# Patient Record
Sex: MALE | Race: WHITE | NOT HISPANIC OR LATINO | Employment: OTHER | URBAN - METROPOLITAN AREA
[De-identification: names, ages, dates, MRNs, and addresses within clinical notes are randomized per-mention and may not be internally consistent; named-entity substitution may affect disease eponyms.]

---

## 2021-07-16 ENCOUNTER — HOSPITAL ENCOUNTER (INPATIENT)
Facility: MEDICAL CENTER | Age: 66
LOS: 1 days | DRG: 808 | End: 2021-07-18
Attending: EMERGENCY MEDICINE | Admitting: STUDENT IN AN ORGANIZED HEALTH CARE EDUCATION/TRAINING PROGRAM
Payer: MEDICARE

## 2021-07-16 ENCOUNTER — APPOINTMENT (OUTPATIENT)
Dept: RADIOLOGY | Facility: MEDICAL CENTER | Age: 66
DRG: 808 | End: 2021-07-16
Attending: EMERGENCY MEDICINE
Payer: MEDICARE

## 2021-07-16 DIAGNOSIS — R06.00 DYSPNEA, UNSPECIFIED TYPE: ICD-10-CM

## 2021-07-16 LAB
ABO GROUP BLD: NORMAL
ALBUMIN SERPL BCP-MCNC: 3.7 G/DL (ref 3.2–4.9)
ALBUMIN/GLOB SERPL: 0.9 G/DL
ALP SERPL-CCNC: 85 U/L (ref 30–99)
ALT SERPL-CCNC: 10 U/L (ref 2–50)
ANION GAP SERPL CALC-SCNC: 19 MMOL/L (ref 7–16)
AST SERPL-CCNC: 17 U/L (ref 12–45)
BARCODED ABORH UBTYP: 600
BARCODED PRD CODE UBPRD: NORMAL
BARCODED UNIT NUM UBUNT: NORMAL
BASOPHILS # BLD AUTO: 0.4 % (ref 0–1.8)
BASOPHILS # BLD: 0.01 K/UL (ref 0–0.12)
BILIRUB SERPL-MCNC: 0.8 MG/DL (ref 0.1–1.5)
BLD GP AB SCN SERPL QL: NORMAL
BUN SERPL-MCNC: 21 MG/DL (ref 8–22)
CALCIUM SERPL-MCNC: 8.7 MG/DL (ref 8.5–10.5)
CHLORIDE SERPL-SCNC: 99 MMOL/L (ref 96–112)
CO2 SERPL-SCNC: 19 MMOL/L (ref 20–33)
COMPONENT R 8504R: NORMAL
CREAT SERPL-MCNC: 2.27 MG/DL (ref 0.5–1.4)
EKG IMPRESSION: NORMAL
EOSINOPHIL # BLD AUTO: 0.03 K/UL (ref 0–0.51)
EOSINOPHIL NFR BLD: 1.1 % (ref 0–6.9)
ERYTHROCYTE [DISTWIDTH] IN BLOOD BY AUTOMATED COUNT: 71.4 FL (ref 35.9–50)
FLUAV RNA SPEC QL NAA+PROBE: NEGATIVE
FLUBV RNA SPEC QL NAA+PROBE: NEGATIVE
GLOBULIN SER CALC-MCNC: 4.1 G/DL (ref 1.9–3.5)
GLUCOSE SERPL-MCNC: 171 MG/DL (ref 65–99)
HCT VFR BLD AUTO: 21.2 % (ref 42–52)
HGB BLD-MCNC: 6.5 G/DL (ref 14–18)
IMM GRANULOCYTES # BLD AUTO: 0.02 K/UL (ref 0–0.11)
IMM GRANULOCYTES NFR BLD AUTO: 0.7 % (ref 0–0.9)
LIPASE SERPL-CCNC: 8 U/L (ref 11–82)
LYMPHOCYTES # BLD AUTO: 1.84 K/UL (ref 1–4.8)
LYMPHOCYTES NFR BLD: 68.4 % (ref 22–41)
MCH RBC QN AUTO: 32.5 PG (ref 27–33)
MCHC RBC AUTO-ENTMCNC: 30.7 G/DL (ref 33.7–35.3)
MCV RBC AUTO: 106 FL (ref 81.4–97.8)
MONOCYTES # BLD AUTO: 0.12 K/UL (ref 0–0.85)
MONOCYTES NFR BLD AUTO: 4.5 % (ref 0–13.4)
NEUTROPHILS # BLD AUTO: 0.67 K/UL (ref 1.82–7.42)
NEUTROPHILS NFR BLD: 24.9 % (ref 44–72)
NRBC # BLD AUTO: 0.08 K/UL
NRBC BLD-RTO: 3 /100 WBC
NT-PROBNP SERPL IA-MCNC: 1581 PG/ML (ref 0–125)
PLATELET # BLD AUTO: 101 K/UL (ref 164–446)
PMV BLD AUTO: 11.1 FL (ref 9–12.9)
POTASSIUM SERPL-SCNC: 4.1 MMOL/L (ref 3.6–5.5)
PRODUCT TYPE UPROD: NORMAL
PROT SERPL-MCNC: 7.8 G/DL (ref 6–8.2)
RBC # BLD AUTO: 2 M/UL (ref 4.7–6.1)
RH BLD: NORMAL
RSV RNA SPEC QL NAA+PROBE: NEGATIVE
SARS-COV-2 RNA RESP QL NAA+PROBE: NOTDETECTED
SODIUM SERPL-SCNC: 137 MMOL/L (ref 135–145)
SPECIMEN SOURCE: NORMAL
TROPONIN T SERPL-MCNC: 28 NG/L (ref 6–19)
UNIT STATUS USTAT: NORMAL
WBC # BLD AUTO: 2.7 K/UL (ref 4.8–10.8)

## 2021-07-16 PROCEDURE — 99285 EMERGENCY DEPT VISIT HI MDM: CPT

## 2021-07-16 PROCEDURE — 85610 PROTHROMBIN TIME: CPT

## 2021-07-16 PROCEDURE — 93005 ELECTROCARDIOGRAM TRACING: CPT | Performed by: EMERGENCY MEDICINE

## 2021-07-16 PROCEDURE — 84484 ASSAY OF TROPONIN QUANT: CPT

## 2021-07-16 PROCEDURE — 80053 COMPREHEN METABOLIC PANEL: CPT

## 2021-07-16 PROCEDURE — 86900 BLOOD TYPING SEROLOGIC ABO: CPT

## 2021-07-16 PROCEDURE — 83880 ASSAY OF NATRIURETIC PEPTIDE: CPT

## 2021-07-16 PROCEDURE — 83690 ASSAY OF LIPASE: CPT

## 2021-07-16 PROCEDURE — 0241U HCHG SARS-COV-2 COVID-19 NFCT DS RESP RNA 4 TRGT MIC: CPT

## 2021-07-16 PROCEDURE — 93005 ELECTROCARDIOGRAM TRACING: CPT

## 2021-07-16 PROCEDURE — C9803 HOPD COVID-19 SPEC COLLECT: HCPCS | Performed by: EMERGENCY MEDICINE

## 2021-07-16 PROCEDURE — 85025 COMPLETE CBC W/AUTO DIFF WBC: CPT

## 2021-07-16 PROCEDURE — 86850 RBC ANTIBODY SCREEN: CPT

## 2021-07-16 PROCEDURE — 85730 THROMBOPLASTIN TIME PARTIAL: CPT

## 2021-07-16 PROCEDURE — 71045 X-RAY EXAM CHEST 1 VIEW: CPT

## 2021-07-16 PROCEDURE — 86901 BLOOD TYPING SEROLOGIC RH(D): CPT

## 2021-07-16 ASSESSMENT — PAIN DESCRIPTION - PAIN TYPE: TYPE: CHRONIC PAIN

## 2021-07-17 ENCOUNTER — APPOINTMENT (OUTPATIENT)
Dept: RADIOLOGY | Facility: MEDICAL CENTER | Age: 66
DRG: 808 | End: 2021-07-17
Attending: STUDENT IN AN ORGANIZED HEALTH CARE EDUCATION/TRAINING PROGRAM
Payer: MEDICARE

## 2021-07-17 PROBLEM — C92.00 AML (ACUTE MYELOBLASTIC LEUKEMIA) (HCC): Status: ACTIVE | Noted: 2021-07-17

## 2021-07-17 PROBLEM — Z79.4 TYPE 2 DIABETES MELLITUS WITH KIDNEY COMPLICATION, WITH LONG-TERM CURRENT USE OF INSULIN (HCC): Status: ACTIVE | Noted: 2021-07-17

## 2021-07-17 PROBLEM — D61.818 PANCYTOPENIA (HCC): Status: ACTIVE | Noted: 2021-07-17

## 2021-07-17 PROBLEM — F32.1 CURRENT MODERATE EPISODE OF MAJOR DEPRESSIVE DISORDER (HCC): Status: ACTIVE | Noted: 2021-07-17

## 2021-07-17 PROBLEM — N17.9 AKI (ACUTE KIDNEY INJURY) (HCC): Status: ACTIVE | Noted: 2021-07-17

## 2021-07-17 PROBLEM — E78.5 HYPERLIPIDEMIA: Status: ACTIVE | Noted: 2021-07-17

## 2021-07-17 PROBLEM — R11.2 NON-INTRACTABLE VOMITING WITH NAUSEA: Status: ACTIVE | Noted: 2021-07-17

## 2021-07-17 PROBLEM — I10 HYPERTENSION: Status: ACTIVE | Noted: 2021-07-17

## 2021-07-17 PROBLEM — D64.9 SYMPTOMATIC ANEMIA: Status: ACTIVE | Noted: 2021-07-17

## 2021-07-17 PROBLEM — I25.10 CAD S/P PERCUTANEOUS CORONARY ANGIOPLASTY: Status: ACTIVE | Noted: 2021-07-17

## 2021-07-17 PROBLEM — Z98.61 CAD S/P PERCUTANEOUS CORONARY ANGIOPLASTY: Status: ACTIVE | Noted: 2021-07-17

## 2021-07-17 PROBLEM — J96.01 ACUTE RESPIRATORY FAILURE WITH HYPOXIA (HCC): Status: ACTIVE | Noted: 2021-07-17

## 2021-07-17 PROBLEM — E11.9 DM (DIABETES MELLITUS) (HCC): Status: ACTIVE | Noted: 2021-07-17

## 2021-07-17 PROBLEM — E66.01 MORBID OBESITY (HCC): Status: ACTIVE | Noted: 2021-07-17

## 2021-07-17 PROBLEM — K21.9 GASTROESOPHAGEAL REFLUX DISEASE WITHOUT ESOPHAGITIS: Status: ACTIVE | Noted: 2021-07-17

## 2021-07-17 PROBLEM — E87.29 METABOLIC ACIDOSIS, INCREASED ANION GAP: Status: ACTIVE | Noted: 2021-07-17

## 2021-07-17 PROBLEM — G47.33 OBSTRUCTIVE SLEEP APNEA: Status: ACTIVE | Noted: 2021-07-17

## 2021-07-17 PROBLEM — R79.1 SUPRATHERAPEUTIC INR: Status: ACTIVE | Noted: 2021-07-17

## 2021-07-17 PROBLEM — E11.29 TYPE 2 DIABETES MELLITUS WITH KIDNEY COMPLICATION, WITH LONG-TERM CURRENT USE OF INSULIN (HCC): Status: ACTIVE | Noted: 2021-07-17

## 2021-07-17 LAB
ABO + RH BLD: NORMAL
ANION GAP SERPL CALC-SCNC: 14 MMOL/L (ref 7–16)
ANISOCYTOSIS BLD QL SMEAR: ABNORMAL
APPEARANCE UR: ABNORMAL
APTT PPP: 128.1 SEC (ref 24.7–36)
BACTERIA #/AREA URNS HPF: NEGATIVE /HPF
BASOPHILS # BLD AUTO: 0 % (ref 0–1.8)
BASOPHILS # BLD: 0 K/UL (ref 0–0.12)
BILIRUB UR QL STRIP.AUTO: NEGATIVE
BUN SERPL-MCNC: 27 MG/DL (ref 8–22)
CALCIUM SERPL-MCNC: 8.7 MG/DL (ref 8.5–10.5)
CHLORIDE SERPL-SCNC: 100 MMOL/L (ref 96–112)
CO2 SERPL-SCNC: 20 MMOL/L (ref 20–33)
COLOR UR: YELLOW
CREAT SERPL-MCNC: 2.48 MG/DL (ref 0.5–1.4)
EOSINOPHIL # BLD AUTO: 0 K/UL (ref 0–0.51)
EOSINOPHIL NFR BLD: 0 % (ref 0–6.9)
EPI CELLS #/AREA URNS HPF: NEGATIVE /HPF
ERYTHROCYTE [DISTWIDTH] IN BLOOD BY AUTOMATED COUNT: 68.6 FL (ref 35.9–50)
ERYTHROCYTE [DISTWIDTH] IN BLOOD BY AUTOMATED COUNT: 72.6 FL (ref 35.9–50)
GLUCOSE SERPL-MCNC: 150 MG/DL (ref 65–99)
GLUCOSE UR STRIP.AUTO-MCNC: NEGATIVE MG/DL
HCT VFR BLD AUTO: 19.8 % (ref 42–52)
HCT VFR BLD AUTO: 23.7 % (ref 42–52)
HGB BLD-MCNC: 6.2 G/DL (ref 14–18)
HGB BLD-MCNC: 7.8 G/DL (ref 14–18)
HYALINE CASTS #/AREA URNS LPF: ABNORMAL /LPF
INR PPP: >=10 (ref 0.87–1.13)
INR PPP: >=10 (ref 0.87–1.13)
KETONES UR STRIP.AUTO-MCNC: NEGATIVE MG/DL
LACTATE BLD-SCNC: 2 MMOL/L (ref 0.5–2)
LEUKOCYTE ESTERASE UR QL STRIP.AUTO: NEGATIVE
LYMPHOCYTES # BLD AUTO: 1.62 K/UL (ref 1–4.8)
LYMPHOCYTES NFR BLD: 77 % (ref 22–41)
MACROCYTES BLD QL SMEAR: ABNORMAL
MANUAL DIFF BLD: NORMAL
MCH RBC QN AUTO: 32.4 PG (ref 27–33)
MCH RBC QN AUTO: 33.3 PG (ref 27–33)
MCHC RBC AUTO-ENTMCNC: 31.3 G/DL (ref 33.7–35.3)
MCHC RBC AUTO-ENTMCNC: 32.9 G/DL (ref 33.7–35.3)
MCV RBC AUTO: 106.5 FL (ref 81.4–97.8)
MCV RBC AUTO: 98.3 FL (ref 81.4–97.8)
MICRO URNS: ABNORMAL
MICROCYTES BLD QL SMEAR: ABNORMAL
MONOCYTES # BLD AUTO: 0.02 K/UL (ref 0–0.85)
MONOCYTES NFR BLD AUTO: 0.9 % (ref 0–13.4)
MORPHOLOGY BLD-IMP: NORMAL
NEUTROPHILS # BLD AUTO: 0.37 K/UL (ref 1.82–7.42)
NEUTROPHILS NFR BLD: 17.7 % (ref 44–72)
NITRITE UR QL STRIP.AUTO: NEGATIVE
NRBC # BLD AUTO: 0.03 K/UL
NRBC BLD-RTO: 1.4 /100 WBC
OVALOCYTES BLD QL SMEAR: NORMAL
PATH REV: NORMAL
PATH REV: NORMAL
PH UR STRIP.AUTO: 5.5 [PH] (ref 5–8)
PLATELET # BLD AUTO: 90 K/UL (ref 164–446)
PLATELET # BLD AUTO: 92 K/UL (ref 164–446)
PLATELET BLD QL SMEAR: NORMAL
PMV BLD AUTO: 10.6 FL (ref 9–12.9)
PMV BLD AUTO: 11.5 FL (ref 9–12.9)
POIKILOCYTOSIS BLD QL SMEAR: NORMAL
POLYCHROMASIA BLD QL SMEAR: NORMAL
POTASSIUM SERPL-SCNC: 3.9 MMOL/L (ref 3.6–5.5)
PROT UR QL STRIP: 30 MG/DL
PROTHROMBIN TIME: 87.4 SEC (ref 12–14.6)
PROTHROMBIN TIME: 95.7 SEC (ref 12–14.6)
RBC # BLD AUTO: 1.86 M/UL (ref 4.7–6.1)
RBC # BLD AUTO: 2.41 M/UL (ref 4.7–6.1)
RBC # URNS HPF: ABNORMAL /HPF
RBC BLD AUTO: PRESENT
RBC UR QL AUTO: ABNORMAL
SODIUM SERPL-SCNC: 134 MMOL/L (ref 135–145)
SP GR UR STRIP.AUTO: 1.02
UROBILINOGEN UR STRIP.AUTO-MCNC: 0.2 MG/DL
WBC # BLD AUTO: 2 K/UL (ref 4.8–10.8)
WBC # BLD AUTO: 2.1 K/UL (ref 4.8–10.8)
WBC #/AREA URNS HPF: ABNORMAL /HPF
WBC OTHER NFR BLD MANUAL: 4.4 %

## 2021-07-17 PROCEDURE — 30233N1 TRANSFUSION OF NONAUTOLOGOUS RED BLOOD CELLS INTO PERIPHERAL VEIN, PERCUTANEOUS APPROACH: ICD-10-PCS | Performed by: STUDENT IN AN ORGANIZED HEALTH CARE EDUCATION/TRAINING PROGRAM

## 2021-07-17 PROCEDURE — 85610 PROTHROMBIN TIME: CPT

## 2021-07-17 PROCEDURE — 86923 COMPATIBILITY TEST ELECTRIC: CPT | Mod: 91

## 2021-07-17 PROCEDURE — 85027 COMPLETE CBC AUTOMATED: CPT | Mod: 91

## 2021-07-17 PROCEDURE — 85007 BL SMEAR W/DIFF WBC COUNT: CPT

## 2021-07-17 PROCEDURE — 700111 HCHG RX REV CODE 636 W/ 250 OVERRIDE (IP): Performed by: STUDENT IN AN ORGANIZED HEALTH CARE EDUCATION/TRAINING PROGRAM

## 2021-07-17 PROCEDURE — 83605 ASSAY OF LACTIC ACID: CPT

## 2021-07-17 PROCEDURE — 80500 HCHG CLINICAL PATH CONSULT-LIMITED: CPT

## 2021-07-17 PROCEDURE — 700102 HCHG RX REV CODE 250 W/ 637 OVERRIDE(OP): Performed by: STUDENT IN AN ORGANIZED HEALTH CARE EDUCATION/TRAINING PROGRAM

## 2021-07-17 PROCEDURE — 80048 BASIC METABOLIC PNL TOTAL CA: CPT

## 2021-07-17 PROCEDURE — 93005 ELECTROCARDIOGRAM TRACING: CPT | Performed by: STUDENT IN AN ORGANIZED HEALTH CARE EDUCATION/TRAINING PROGRAM

## 2021-07-17 PROCEDURE — 99223 1ST HOSP IP/OBS HIGH 75: CPT | Mod: AI | Performed by: STUDENT IN AN ORGANIZED HEALTH CARE EDUCATION/TRAINING PROGRAM

## 2021-07-17 PROCEDURE — 86945 BLOOD PRODUCT/IRRADIATION: CPT | Mod: 91

## 2021-07-17 PROCEDURE — 770004 HCHG ROOM/CARE - ONCOLOGY PRIVATE *

## 2021-07-17 PROCEDURE — P9016 RBC LEUKOCYTES REDUCED: HCPCS

## 2021-07-17 PROCEDURE — 74176 CT ABD & PELVIS W/O CONTRAST: CPT | Mod: MG

## 2021-07-17 PROCEDURE — 94760 N-INVAS EAR/PLS OXIMETRY 1: CPT

## 2021-07-17 PROCEDURE — 36430 TRANSFUSION BLD/BLD COMPNT: CPT

## 2021-07-17 PROCEDURE — 36415 COLL VENOUS BLD VENIPUNCTURE: CPT

## 2021-07-17 PROCEDURE — 81001 URINALYSIS AUTO W/SCOPE: CPT

## 2021-07-17 PROCEDURE — 700105 HCHG RX REV CODE 258: Performed by: STUDENT IN AN ORGANIZED HEALTH CARE EDUCATION/TRAINING PROGRAM

## 2021-07-17 PROCEDURE — A9270 NON-COVERED ITEM OR SERVICE: HCPCS | Performed by: STUDENT IN AN ORGANIZED HEALTH CARE EDUCATION/TRAINING PROGRAM

## 2021-07-17 RX ORDER — PROCHLORPERAZINE MALEATE 10 MG
10 TABLET ORAL EVERY 6 HOURS PRN
Status: DISCONTINUED | OUTPATIENT
Start: 2021-07-17 | End: 2021-07-18 | Stop reason: HOSPADM

## 2021-07-17 RX ORDER — BUPROPION HYDROCHLORIDE 150 MG/1
150 TABLET, EXTENDED RELEASE ORAL 2 TIMES DAILY
Status: DISCONTINUED | OUTPATIENT
Start: 2021-07-17 | End: 2021-07-18 | Stop reason: HOSPADM

## 2021-07-17 RX ORDER — FUROSEMIDE 10 MG/ML
40 INJECTION INTRAMUSCULAR; INTRAVENOUS ONCE
Status: DISCONTINUED | OUTPATIENT
Start: 2021-07-17 | End: 2021-07-17

## 2021-07-17 RX ORDER — LORAZEPAM 2 MG/ML
0.5 INJECTION INTRAMUSCULAR ONCE
Status: COMPLETED | OUTPATIENT
Start: 2021-07-17 | End: 2021-07-17

## 2021-07-17 RX ORDER — BUPROPION HYDROCHLORIDE 150 MG/1
150 TABLET, EXTENDED RELEASE ORAL 2 TIMES DAILY
COMMUNITY
Start: 2021-03-15

## 2021-07-17 RX ORDER — ATORVASTATIN CALCIUM 40 MG/1
40 TABLET, FILM COATED ORAL EVERY EVENING
COMMUNITY
Start: 2021-02-19 | End: 2023-02-19

## 2021-07-17 RX ORDER — ALBUTEROL SULFATE 90 UG/1
2 AEROSOL, METERED RESPIRATORY (INHALATION)
COMMUNITY
Start: 2020-10-19 | End: 2022-10-19

## 2021-07-17 RX ORDER — ACETAMINOPHEN 325 MG/1
650 TABLET ORAL EVERY 6 HOURS PRN
Status: DISCONTINUED | OUTPATIENT
Start: 2021-07-17 | End: 2021-07-18 | Stop reason: HOSPADM

## 2021-07-17 RX ORDER — PROCHLORPERAZINE MALEATE 10 MG
10 TABLET ORAL 2 TIMES DAILY
COMMUNITY
Start: 2021-06-28 | End: 2023-06-28

## 2021-07-17 RX ORDER — ATORVASTATIN CALCIUM 40 MG/1
40 TABLET, FILM COATED ORAL EVERY EVENING
Status: DISCONTINUED | OUTPATIENT
Start: 2021-07-17 | End: 2021-07-18 | Stop reason: HOSPADM

## 2021-07-17 RX ORDER — LISINOPRIL 5 MG/1
5 TABLET ORAL DAILY
COMMUNITY
Start: 2021-03-15 | End: 2023-03-15

## 2021-07-17 RX ORDER — HYDROMORPHONE HYDROCHLORIDE 1 MG/ML
1 INJECTION, SOLUTION INTRAMUSCULAR; INTRAVENOUS; SUBCUTANEOUS EVERY 4 HOURS PRN
Status: DISCONTINUED | OUTPATIENT
Start: 2021-07-17 | End: 2021-07-17 | Stop reason: ALTCHOICE

## 2021-07-17 RX ORDER — OMEPRAZOLE 20 MG/1
20 CAPSULE, DELAYED RELEASE ORAL DAILY
Status: DISCONTINUED | OUTPATIENT
Start: 2021-07-17 | End: 2021-07-18 | Stop reason: HOSPADM

## 2021-07-17 RX ORDER — SODIUM CHLORIDE 9 MG/ML
500 INJECTION, SOLUTION INTRAVENOUS ONCE
Status: DISCONTINUED | OUTPATIENT
Start: 2021-07-17 | End: 2021-07-17

## 2021-07-17 RX ORDER — ONDANSETRON 8 MG/1
8 TABLET, ORALLY DISINTEGRATING ORAL 2 TIMES DAILY
COMMUNITY

## 2021-07-17 RX ORDER — LORAZEPAM 2 MG/ML
1 INJECTION INTRAMUSCULAR EVERY 4 HOURS PRN
Status: DISCONTINUED | OUTPATIENT
Start: 2021-07-17 | End: 2021-07-18 | Stop reason: HOSPADM

## 2021-07-17 RX ORDER — ACETAMINOPHEN 325 MG/1
650 TABLET ORAL
COMMUNITY
Start: 2021-05-26 | End: 2021-09-21

## 2021-07-17 RX ORDER — WARFARIN SODIUM 5 MG/1
7.5-1 TABLET ORAL DAILY
COMMUNITY
Start: 2021-05-24

## 2021-07-17 RX ORDER — PANTOPRAZOLE SODIUM 40 MG/1
40 TABLET, DELAYED RELEASE ORAL DAILY
COMMUNITY
Start: 2021-05-24 | End: 2023-05-24

## 2021-07-17 RX ORDER — SODIUM CHLORIDE 9 MG/ML
INJECTION, SOLUTION INTRAVENOUS CONTINUOUS
Status: DISCONTINUED | OUTPATIENT
Start: 2021-07-17 | End: 2021-07-17 | Stop reason: ALTCHOICE

## 2021-07-17 RX ORDER — CICLESONIDE 160 UG/1
1 AEROSOL, METERED RESPIRATORY (INHALATION) DAILY
COMMUNITY
Start: 2020-10-20 | End: 2022-10-20

## 2021-07-17 RX ORDER — FUROSEMIDE 20 MG/1
60 TABLET ORAL 2 TIMES DAILY
COMMUNITY
Start: 2021-03-15 | End: 2023-03-15

## 2021-07-17 RX ORDER — GLUCOSAMINE/CHONDROITIN/C/MANG 500-400 MG
1 CAPSULE ORAL DAILY
COMMUNITY

## 2021-07-17 RX ORDER — PROCHLORPERAZINE EDISYLATE 5 MG/ML
10 INJECTION INTRAMUSCULAR; INTRAVENOUS EVERY 6 HOURS PRN
Status: DISCONTINUED | OUTPATIENT
Start: 2021-07-17 | End: 2021-07-18 | Stop reason: HOSPADM

## 2021-07-17 RX ORDER — ASPIRIN 81 MG/1
81 TABLET ORAL DAILY
COMMUNITY

## 2021-07-17 RX ORDER — CYANOCOBALAMIN 1000 UG/ML
1000 INJECTION, SOLUTION INTRAMUSCULAR; SUBCUTANEOUS DAILY
COMMUNITY
Start: 2021-02-24 | End: 2022-03-22

## 2021-07-17 RX ADMIN — BUPROPION HYDROCHLORIDE 150 MG: 150 TABLET, EXTENDED RELEASE ORAL at 14:06

## 2021-07-17 RX ADMIN — HYDROMORPHONE HYDROCHLORIDE 1 MG: 1 INJECTION, SOLUTION INTRAMUSCULAR; INTRAVENOUS; SUBCUTANEOUS at 02:48

## 2021-07-17 RX ADMIN — LORAZEPAM 0.5 MG: 2 INJECTION INTRAMUSCULAR; INTRAVENOUS at 09:27

## 2021-07-17 RX ADMIN — PHYTONADIONE 10 MG: 10 INJECTION, EMULSION INTRAMUSCULAR; INTRAVENOUS; SUBCUTANEOUS at 09:40

## 2021-07-17 RX ADMIN — OMEPRAZOLE 20 MG: 20 CAPSULE, DELAYED RELEASE ORAL at 05:53

## 2021-07-17 RX ADMIN — SODIUM CHLORIDE: 9 INJECTION, SOLUTION INTRAVENOUS at 02:48

## 2021-07-17 RX ADMIN — BUPROPION HYDROCHLORIDE 150 MG: 150 TABLET, EXTENDED RELEASE ORAL at 04:05

## 2021-07-17 RX ADMIN — PROCHLORPERAZINE EDISYLATE 10 MG: 5 INJECTION INTRAMUSCULAR; INTRAVENOUS at 14:07

## 2021-07-17 RX ADMIN — ATORVASTATIN CALCIUM 40 MG: 40 TABLET, FILM COATED ORAL at 18:44

## 2021-07-17 ASSESSMENT — ENCOUNTER SYMPTOMS
NECK PAIN: 0
SHORTNESS OF BREATH: 0
BRUISES/BLEEDS EASILY: 0
CHILLS: 0
MYALGIAS: 0
DIARRHEA: 0
BACK PAIN: 0
CONSTIPATION: 0
SORE THROAT: 0
HEMOPTYSIS: 0
HEADACHES: 0
DEPRESSION: 1
SINUS PAIN: 0
ABDOMINAL PAIN: 0
NERVOUS/ANXIOUS: 0
FLANK PAIN: 0
FEVER: 0
EYE PAIN: 0
NAUSEA: 1
BLOOD IN STOOL: 0
VOMITING: 1

## 2021-07-17 ASSESSMENT — PAIN DESCRIPTION - PAIN TYPE
TYPE: ACUTE PAIN

## 2021-07-17 NOTE — PROGRESS NOTES
"4 Eyes Skin Assessment Completed by ELIZABETH Broussard and ELIZABETH Vegas.    Head Scab  Ears Redness and Blanching, Wound \"Skin Cancer\" per patient. Picture uploaded in Epic  Nose WDL  Mouth WDL  Neck WDL  Breast/Chest Scar  Shoulder Blades WDL  Spine WDL  (R) Arm/Elbow/Hand WDL  (L) Arm/Elbow/Hand Scar  Abdomen Scar and Bruising  Groin WDL  Scrotum/Coccyx/Buttocks WDL  (R) Leg Bruising   (L) Leg Scar and Bruising Dusky  (R) Heel/Foot/Toe Blanching Dry and Flaky  (L) Heel/Foot/Toe Blanching Dry and Flaky          Devices In Places Pulse Ox and Nasal Cannula      Interventions In Place NC W/Ear Foams, Waffle Overlay, Pillows and Pressure Redistribution Mattress    Possible Skin Injury No    Pictures Uploaded Into Epic Yes  Wound Consult Placed N/A  RN Wound Prevention Protocol Ordered No     "

## 2021-07-17 NOTE — PROGRESS NOTES
Seferino from Lab called with critical result of WBC 2.1 at 0825. Critical lab result read back to Seferino.   This critical lab result is within parameters established by  for this patient

## 2021-07-17 NOTE — ED PROVIDER NOTES
ED Provider Note    CHIEF COMPLAINT  Chief Complaint   Patient presents with   • GI Bleeding       HPI  Juan Chen is a 66 y.o. male who presents with difficulty with breathing.  The patient states he has had progressive increased work of breathing over the last 24 to 48 hours.  He states he does have a productive cough.  As for the GI bleeding noted in triage.  The patient states he has hemorrhoids and occasionally noticed some bright red blood per his rectum.  He states he was recently diagnosed with leukemia at the end of May and did receive chemo injections and is scheduled for repeat exam by his oncologist when he returns to [place] on Sunday from vacation.  He states he did have the Covid vaccine.  He does have associated cramping abdominal pain with some diarrhea.  He has not had any fevers.    REVIEW OF SYSTEMS  See HPI for further details. All other systems are negative.     PAST MEDICAL HISTORY  No past medical history on file.    FAMILY HISTORY  [unfilled]    SOCIAL HISTORY  Social History     Socioeconomic History   • Marital status: Not on file     Spouse name: Not on file   • Number of children: Not on file   • Years of education: Not on file   • Highest education level: Not on file   Occupational History   • Not on file   Tobacco Use   • Smoking status: Not on file   Substance and Sexual Activity   • Alcohol use: Not on file   • Drug use: Not on file   • Sexual activity: Not on file   Other Topics Concern   • Not on file   Social History Narrative   • Not on file     Social Determinants of Health     Financial Resource Strain:    • Difficulty of Paying Living Expenses:    Food Insecurity:    • Worried About Running Out of Food in the Last Year:    • Ran Out of Food in the Last Year:    Transportation Needs:    • Lack of Transportation (Medical):    • Lack of Transportation (Non-Medical):    Physical Activity:    • Days of Exercise per Week:    • Minutes of Exercise per Session:    Stress:   "  • Feeling of Stress :    Social Connections:    • Frequency of Communication with Friends and Family:    • Frequency of Social Gatherings with Friends and Family:    • Attends [employer] Services:    • Active Member of Clubs or Organizations:    • Attends Club or Organization Meetings:    • Marital Status:    Intimate Partner Violence:    • Fear of Current or Ex-Partner:    • Emotionally Abused:    • Physically Abused:    • Sexually Abused:        SURGICAL HISTORY  No past surgical history on file.    CURRENT MEDICATIONS  Home Medications    **Home medications have not yet been reviewed for this encounter**         ALLERGIES  Allergies   Allergen Reactions   • Fiorinal    • Other Misc      Paper tape         PHYSICAL EXAM  VITAL SIGNS: /59   Pulse (!) 113   Temp 37 °C (98.6 °F) (Oral)   Resp 20   Ht 1.778 m (5' 10\")   Wt 118 kg (260 lb)   SpO2 92%   BMI 37.31 kg/m²       Constitutional: Ill in appearance  HENT: Normocephalic, Atraumatic, Bilateral external ears normal, Oropharynx moist, No oral exudates, Nose normal.   Eyes: PERRLA, EOMI, Conjunctiva normal, No discharge.   Neck: Normal range of motion, No tenderness, Supple, No stridor.   Lymphatic: No lymphadenopathy noted.   Cardiovascular: Tachycardic heart rate, Normal rhythm, No murmurs, No rubs, No gallops.   Thorax & Lungs: Symmetrically diminished throughout with diffuse rhonchi  Abdomen: Bowel sounds normal, Soft, mild diffuse tenderness, No masses, No pulsatile masses.   Skin: Warm, Dry, No erythema, No rash.   Back: No tenderness, No CVA tenderness.    Extremities: Intact distal pulses, No edema, No tenderness, No cyanosis, No clubbing.   Neurologic: Alert & oriented x 3, Normal motor function, Normal sensory function, No focal deficits noted.   Psychiatric: Affect normal, Judgment normal, Mood normal.     Results for orders placed or performed during the hospital encounter of 07/16/21   COD (ADULT)   Result Value Ref Range    ABO Grouping " Only A     Rh Grouping Only NEG     Antibody Screen-Cod NEG    CBC WITH DIFFERENTIAL   Result Value Ref Range    WBC 2.7 (L) 4.8 - 10.8 K/uL    RBC 2.00 (L) 4.70 - 6.10 M/uL    Hemoglobin 6.5 (L) 14.0 - 18.0 g/dL    Hematocrit 21.2 (L) 42.0 - 52.0 %    .0 (H) 81.4 - 97.8 fL    MCH 32.5 27.0 - 33.0 pg    MCHC 30.7 (L) 33.7 - 35.3 g/dL    RDW 71.4 (H) 35.9 - 50.0 fL    Platelet Count 101 (L) 164 - 446 K/uL    MPV 11.1 9.0 - 12.9 fL    Neutrophils-Polys 24.90 (L) 44.00 - 72.00 %    Lymphocytes 68.40 (H) 22.00 - 41.00 %    Monocytes 4.50 0.00 - 13.40 %    Eosinophils 1.10 0.00 - 6.90 %    Basophils 0.40 0.00 - 1.80 %    Immature Granulocytes 0.70 0.00 - 0.90 %    Nucleated RBC 3.00 /100 WBC    Neutrophils (Absolute) 0.67 (L) 1.82 - 7.42 K/uL    Lymphs (Absolute) 1.84 1.00 - 4.80 K/uL    Monos (Absolute) 0.12 0.00 - 0.85 K/uL    Eos (Absolute) 0.03 0.00 - 0.51 K/uL    Baso (Absolute) 0.01 0.00 - 0.12 K/uL    Immature Granulocytes (abs) 0.02 0.00 - 0.11 K/uL    NRBC (Absolute) 0.08 K/uL   COMP METABOLIC PANEL   Result Value Ref Range    Sodium 137 135 - 145 mmol/L    Potassium 4.1 3.6 - 5.5 mmol/L    Chloride 99 96 - 112 mmol/L    Co2 19 (L) 20 - 33 mmol/L    Anion Gap 19.0 (H) 7.0 - 16.0    Glucose 171 (H) 65 - 99 mg/dL    Bun 21 8 - 22 mg/dL    Creatinine 2.27 (H) 0.50 - 1.40 mg/dL    Calcium 8.7 8.5 - 10.5 mg/dL    AST(SGOT) 17 12 - 45 U/L    ALT(SGPT) 10 2 - 50 U/L    Alkaline Phosphatase 85 30 - 99 U/L    Total Bilirubin 0.8 0.1 - 1.5 mg/dL    Albumin 3.7 3.2 - 4.9 g/dL    Total Protein 7.8 6.0 - 8.2 g/dL    Globulin 4.1 (H) 1.9 - 3.5 g/dL    A-G Ratio 0.9 g/dL   LIPASE   Result Value Ref Range    Lipase 8 (L) 11 - 82 U/L   COV-2, FLU A/B, AND RSV BY PCR (2-4 HOURS CEPHEID): Collect NP swab in VTM    Specimen: Nasopharyngeal; Respirate   Result Value Ref Range    Influenza virus A RNA Negative Negative    Influenza virus B, PCR Negative Negative    RSV, PCR Negative Negative    SARS-CoV-2 by PCR NotDetected      SARS-CoV-2 Source NP Swab    ESTIMATED GFR   Result Value Ref Range    GFR If African American 35 (A) >60 mL/min/1.73 m 2    GFR If Non  29 (A) >60 mL/min/1.73 m 2   proBrain Natriuretic Peptide, NT   Result Value Ref Range    NT-proBNP 1581 (H) 0 - 125 pg/mL   TROPONIN   Result Value Ref Range    Troponin T 28 (H) 6 - 19 ng/L   EKG (NOW)   Result Value Ref Range    Report       [facility] Emergency Dept.    Test Date:  2021  Pt Name:    TYREL LOERA                  Department: ER  MRN:        [id number]                      Room:  Gender:     Male                         Technician: [id number]  :        1955                   Requested By:ER TRIAGE PROTOCOL  Order #:    [id number]                    Reading MD:    Measurements  Intervals                                Axis  Rate:       118                          P:          7  NV:         124                          QRS:        58  QRSD:       160                          T:          9  QT:         376  QTc:        528    Interpretive Statements  SINUS TACHYCARDIA  RIGHT BUNDLE BRANCH BLOCK  No previous ECG available for comparison         RADIOLOGY/PROCEDURES  DX-CHEST-PORTABLE (1 VIEW)   Final Result            1. Mild interstitial prominence could relate to mild fluid overload.   2. Left basilar opacity could be atelectasis, scarring or infection.   3. Several sternal wires are fractured.            COURSE & MEDICAL DECISION MAKING  Pertinent Labs & Imaging studies reviewed. (See chart for details)  This a 66-year-old gentleman who presents the emergency department with difficulty with breathing.  I suspect this is a combination of heart failure, altitude, and his anemia.  He does have a slight elevation in his troponin but has not had any chest pain.  His EKG is abnormal but there is no obvious ST segment elevation.  The patient is from [place] we don't have labs to compare what his baseline is.  He does have a  known history of leukemia and I suspect this is the cause of his pancytopenia.  I not see any obvious source of bleeding at this time.  He states he has some hemorrhoids with some bright red blood and I do not suspect he has an acute GI bleed.  The patient's BNP and chest x-ray does support heart failure.  I do not want to give Lasix as his blood pressures like 120 systolic and he has an elevation in his creatinine.  We'll bit the patient to the hospital for further work-up and treatment.    FINAL IMPRESSION  1.  Dyspnea  2.  Pancytopenia  3.  Renal insufficiency    Disposition  The patient will be admitted in stable condition         Electronically signed by: Braden Wagoner M.D., 7/16/2021 10:02 PM

## 2021-07-17 NOTE — ED NOTES
Med Rec completed: per pt at bedside      No ORAL antibiotics in last 30 days    Preferred Pharmacy: [facility] P: [phone]    Pt confirmed following allergies:  Allergies   Allergen Reactions   • Fiorinal    • Other Misc      Paper tape        Pt's home medications:   Medication Sig   • acetaminophen (TYLENOL) 325 MG Tab Take 650 mg by mouth.   • albuterol (VENTOLIN HFA) 108 (90 Base) MCG/ACT Aero Soln inhalation aerosol Inhale 2 Puffs 1 time a day as needed for Shortness of Breath.   • atorvastatin (LIPITOR) 40 MG Tab Take 40 mg by mouth every evening.   • buPROPion SR (WELLBUTRIN-SR) 150 MG TABLET SR 12 HR sustained-release tablet Take 150 mg by mouth 2 times a day.   • Ciclesonide (ALVESCO) 160 MCG/ACT Aero Soln Inhale 1 Puff every day.   • cyanocobalamin (VITAMIN B-12) 1000 MCG/ML Solution Inject 1,000 mcg into the shoulder, thigh, or buttocks every day.   • furosemide (LASIX) 20 MG Tab Take 60 mg by mouth 2 times a day.   • warfarin (COUMADIN) 5 MG Tab Take 7.5-10 mg by mouth every day. 7.5 mg on Saturday, Sunday, Tuesday, Thursday 10mg on Monday, Wednesday, Friday   • insulin NPH (HUMULIN N) 100 UNIT/ML Suspension Inject 16 Units under the skin every morning.   • lisinopril (PRINIVIL) 5 MG Tab Take 5 mg by mouth every day.   • metformin (GLUCOPHAGE) 1000 MG tablet Take 500-1,000 mg by mouth every day. 1,000 mg in the morning 500 mg in the evening   • metoprolol tartrate (LOPRESSOR) 25 MG Tab Take 25 mg by mouth 2 times a day.   • pantoprazole (PROTONIX) 40 MG Tablet Delayed Response Take 40 mg by mouth every day.   • prochlorperazine (COMPAZINE) 10 MG Tab Take 10 mg by mouth 2 times a day. After meals   • ondansetron (ZOFRAN ODT) 8 MG TABLET DISPERSIBLE Take 8 mg by mouth 2 times a day. Before meals   • Phytonadione 500 MCG TABLET DISPERSIBLE Take 500 mcg by mouth every day.   • aspirin 81 MG EC tablet Take 81 mg by mouth every day.   • Glucosamine-Chondroit-Vit C-Mn (GLUCOSAMINE-CHONDROITIN) Cap Take  1 tablet by mouth every day.

## 2021-07-17 NOTE — ASSESSMENT & PLAN NOTE
Hemoglobin 6.5, likely due to combination of azacitidine and leukemia  Unchanged s/p 1 unit PRBC (unclear timing of draw relative to starting the unit)  Repeat 1 unit PRBC with post-transfusion CBC  CT abdomen/pelvis due to recent abdominal pain and coagulopathy  Transfuse for Hgb <7

## 2021-07-17 NOTE — ASSESSMENT & PLAN NOTE
Restart blood pressure medication as needed  Lasix and lisinopril held in setting of CARMEN and possible bleeding

## 2021-07-17 NOTE — ASSESSMENT & PLAN NOTE
A1c 6.2  No indication for strict BG control  SSI/POCT discontinued  Metformin and NPH held on admission

## 2021-07-17 NOTE — CARE PLAN
The patient is Watcher - Medium risk of patient condition declining or worsening    Shift Goals  Clinical Goals: blood transfusion and pain control  Patient Goals: pain control and rest    Progress made toward(s) clinical / shift goals:    Problem: Pain - Standard  Goal: Alleviation of pain or a reduction in pain to the patient’s comfort goal  Outcome: Progressing  Note: Patient educated on and offered pain management interventions.       Problem: Knowledge Deficit - Standard  Goal: Patient and family/care givers will demonstrate understanding of plan of care, disease process/condition, diagnostic tests and medications  Outcome: Progressing  Note: Patient assessed and discussed plan of care. All patient questions answered at this time.   Transfusing RBC per MD order       Patient is not progressing towards the following goals:

## 2021-07-17 NOTE — ED NOTES
Patient sitting comfortably at bedside. Vitals currently stable. Paramedic student at bedside obtaining PIV. Patient tolerating well. Family at bedside.

## 2021-07-17 NOTE — PROGRESS NOTES
Seferino from Lab called with critical result of ANC 0.37 at 0858. Critical lab result read back to Seferino.   This critical lab result is within parameters established by  for this patient

## 2021-07-17 NOTE — PROGRESS NOTES
"Hospital Medicine Daily Progress Note    Date of Service  7/17/2021    Chief Complaint  Juan Shelton Say is a 66 y.o. 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    Hospital Course  No notes on file    Interval Problem Update  He is nauseous, which did not improve with   He reports occasional chest pain after CABG that is positional from \"messed up healing after bypass.\"   He may be referring to fractured sternotomy wires seen on the CXR.  He denies current chest pain nor any other pain.  He has hemorrhoids but denies recent BRBPR, melena, hematochezia.  He denies any bleeding, F/C, bowel/bladder dysfunction.    He received 1 unit PRBC ON without improvement in Hgb.  Receiving 2nd unit and ordered CT A/P for occult peritoneal / retroperitoneal bleed.    I updated family (wife and son) on POC at bedside. They expressed that he has been declining for the past month, which has noticeably increased since 5 days of azacitidine. They would like him to live until [place] but recognize that he may not be able to make it given his decline. I advised that azacitidine is a low-intensity regimen that may have been recommended due to his comorbidities. I advised that they follow up with Dr. Lr after we stabilize him acutely and discuss further treatment prospects versus hospice. We will determine if he has acute blood loss from hemorrhage or if his anemia is due to transfusion-refractory disease.    I have personally seen and examined the patient at bedside. I discussed the plan of care with patient, family, bedside RN, charge RN and .    I discussed transfusion amount and plan with blood bank. They confirmed he had only received 1 unit of PRBC ON. I notified them that I would order a 2nd unit and recheck CBC.    Consultants/Specialty  None    Code Status  Full Code    Disposition  Patient is not medically " cleared.   Anticipate discharge to to home with close outpatient follow-up.  I have placed the appropriate orders for post-discharge needs.    Review of Systems  Review of Systems   Constitutional: Positive for malaise/fatigue. Negative for chills and fever.   HENT: Negative for ear pain, nosebleeds, sinus pain and sore throat.    Eyes: Negative for pain.   Respiratory: Negative for hemoptysis and shortness of breath.    Cardiovascular: Positive for chest pain. Negative for leg swelling.   Gastrointestinal: Positive for nausea and vomiting. Negative for abdominal pain, blood in stool, constipation, diarrhea and melena.   Genitourinary: Negative for dysuria, flank pain and hematuria.   Musculoskeletal: Negative for back pain, joint pain, myalgias and neck pain.   Neurological: Negative for headaches.   Endo/Heme/Allergies: Does not bruise/bleed easily.   Psychiatric/Behavioral: Positive for depression. The patient is not nervous/anxious.         Physical Exam  Temp:  [36.1 °C (97 °F)-37.6 °C (99.7 °F)] 37.2 °C (99 °F)  Pulse:  [103-135] 127  Resp:  [16-22] 20  BP: ()/(50-72) 121/50  SpO2:  [90 %-99 %] 92 %    Physical Exam  Vitals and nursing note reviewed. Exam conducted with a chaperone present.   Constitutional:       General: He is not in acute distress.     Appearance: He is obese. He is ill-appearing (Chronically). He is not toxic-appearing or diaphoretic.   HENT:      Head: Normocephalic.      Nose: Nose normal.      Mouth/Throat:      Mouth: Mucous membranes are moist.      Pharynx: Oropharynx is clear.   Eyes:      General: No scleral icterus.     Conjunctiva/sclera: Conjunctivae normal.   Cardiovascular:      Rate and Rhythm: Regular rhythm. Tachycardia present.      Pulses: Normal pulses.      Heart sounds: Normal heart sounds. No murmur heard.   No friction rub. No gallop.    Pulmonary:      Effort: Pulmonary effort is normal. No respiratory distress.      Breath sounds: Decreased breath sounds  present. No wheezing, rhonchi or rales.   Abdominal:      General: Abdomen is protuberant. Bowel sounds are normal. There is no distension.      Palpations: Abdomen is soft.      Tenderness: There is no abdominal tenderness. There is no guarding or rebound.   Genitourinary:     Comments: No arzola. Stool yellow / loose.  Musculoskeletal:      Cervical back: Neck supple.      Right lower leg: No edema.      Left lower leg: No edema.   Skin:     General: Skin is warm and dry.      Coloration: Skin is pale.   Neurological:      Mental Status: He is alert.      Comments: Appropriately conversant   Psychiatric:         Attention and Perception: Attention and perception normal.         Mood and Affect: Affect normal. Mood is depressed.         Speech: Speech normal.         Behavior: Behavior is slowed. Behavior is cooperative.         Thought Content: Thought content normal.         Cognition and Memory: Cognition and memory normal.         Judgment: Judgment normal.         Fluids    Intake/Output Summary (Last 24 hours) at 7/17/2021 1629  Last data filed at 7/17/2021 1310  Gross per 24 hour   Intake 1570.5 ml   Output 1125 ml   Net 445.5 ml       Laboratory  Recent Labs     07/16/21 2154 07/17/21 0316   WBC 2.7* 2.1*   RBC 2.00* 1.86*   HEMOGLOBIN 6.5* 6.2*   HEMATOCRIT 21.2* 19.8*   .0* 106.5*   MCH 32.5 33.3*   MCHC 30.7* 31.3*   RDW 71.4* 72.6*   PLATELETCT 101* 92*   MPV 11.1 11.5     Recent Labs     07/16/21 2154 07/17/21 0316   SODIUM 137 134*   POTASSIUM 4.1 3.9   CHLORIDE 99 100   CO2 19* 20   GLUCOSE 171* 150*   BUN 21 27*   CREATININE 2.27* 2.48*   CALCIUM 8.7 8.7     Recent Labs     07/16/21 2154 07/17/21 0316   APTT 128.1*  --    INR >=10.00* >=10.00*               Imaging  DX-CHEST-PORTABLE (1 VIEW)   Final Result            1. Mild interstitial prominence could relate to mild fluid overload.   2. Left basilar opacity could be atelectasis, scarring or infection.   3. Several sternal wires are  fractured.      CT-ABDOMEN-PELVIS W/O    (Results Pending)        Assessment/Plan  * Normocytic anemia- (present on admission)  Assessment & Plan  Hemoglobin 6.5, likely due to combination of azacitidine and leukemia  Unchanged s/p 1 unit PRBC (unclear timing of draw relative to starting the unit)  Repeat 1 unit PRBC with post-transfusion CBC  CT abdomen/pelvis due to recent abdominal pain and coagulopathy  Transfuse for Hgb <7    CARMEN (acute kidney injury) (HCC)- (present on admission)  Assessment & Plan  Cr 2.3 on admission from Cr 1.7 in (6/24/21)  Likely intrinsic from ischemia  Diuresis deferred for now  Urinalysis with microscopy  2u PRBC since admission for volume and oxygen delivery  Repeat AM BMP    Acute myeloid leukemia not having achieved remission (HCC)- (present on admission)  Assessment & Plan  Records available from [place] in Care Everywhere  Follows with oncologist Dr. Lr  Underwent Cycle 1 Azacitidine 6/28/21, may add venetoclax if tolerates HMA  Poor prognosis with noticeable decline in the past week, family is considering hospice when returning to [facility]    Supratherapeutic INR- (present on admission)  Assessment & Plan  Takes Coumadin for history of DVT and PE  INR >=10  Administered IV phytonadione  Repeat AM INR    Pancytopenia (HCC)- (present on admission)  Assessment & Plan  Due to AML and azacitidine  Likely acutely exacerbated by IVF, which has been discontinued  Standing transfusion order for Hgb <7 and Plt <10    Metabolic acidosis, increased anion gap- (present on admission)  Assessment & Plan  Likely due to CARMEN  Lactate WNL  IVF via PRBC transfusions  Repeat AM BMP    Acute respiratory failure with hypoxia (HCC)- (present on admission)  Assessment & Plan  Possibly exacerbated by symptomatic anemia, transfusions, and altitude  CXR with mild congestion  Monitoring oxygen with transfusions, if signs of hypoxia will diurese for TACO and obtain TTE    Essential hypertension- (present on  admission)  Assessment & Plan  Restart blood pressure medication as needed  Lasix and lisinopril held in setting of CARMEN and possible bleeding    Non-intractable vomiting with nausea- (present on admission)  Assessment & Plan  Exacerbated recently by azacitidine  Continue PRN zofran, compazine, and lorazepam    Gastroesophageal reflux disease without esophagitis- (present on admission)  Assessment & Plan  Continue omeprazole (substituted for pantoprazole)    Current moderate episode of major depressive disorder ([facility])- (present on admission)  Assessment & Plan  Continue wellbutrin    Type 2 diabetes mellitus with kidney complication, with long-term current use of insulin ([facility])- (present on admission)  Assessment & Plan  A1c 6.2  No indication for strict BG control  SSI/POCT discontinued  Metformin and NPH held on admission    Coronary artery disease involving native coronary artery of native heart without angina pectoris- (present on admission)  Assessment & Plan  S/p CABG  Continue atorvastatin  ASA and warfarin held due to anemia and coaglulopathy    Hyperlipidemia- (present on admission)  Assessment & Plan  Continue atorvastatin    Obstructive sleep apnea- (present on admission)  Assessment & Plan  Continue CPAP nightly (home unit provided by family)    Severe obesity (BMI 35.0-35.9 with comorbidity) ([facility])- (present on admission)  Assessment & Plan  Comorbid SHON, T2DM  Weight loss may not be within GOC given his poor prognosis       VTE prophylaxis: SCDs/TEDs and pharmacologic prophylaxis contraindicated due to supratherapeutic INR    I have performed a physical exam and reviewed and updated ROS and Plan today (7/17/2021). In review of yesterday's note (7/16/2021), there are no changes except as documented above.

## 2021-07-17 NOTE — ASSESSMENT & PLAN NOTE
Cr 2.3 on admission from Cr 1.7 in (6/24/21)  Likely intrinsic from ischemia  Diuresis deferred for now  Urinalysis with microscopy  2u PRBC since admission for volume and oxygen delivery  Repeat AM BMP

## 2021-07-17 NOTE — ASSESSMENT & PLAN NOTE
Records available from [place] in Care Everywhere  Follows with oncologist Dr. Lr  Underwent Cycle 1 Azacitidine 6/28/21, may add venetoclax if tolerates HMA  Poor prognosis with noticeable decline in the past week, family is considering hospice when returning to [facility]

## 2021-07-17 NOTE — H&P
Hospital Medicine History & Physical Note    Date of Service  7/17/2021    Primary Care Physician  No primary care provider on file.    Consultants  None    Code Status  Full Code    Chief Complaint  Chief Complaint   Patient presents with   • GI Bleeding       History of Presenting Illness  Juan Chen is a 66 y.o. male who presented 7/16/2021 with shortness of breath.  Patient is here for vacation visiting from [place].  He states that he has been feeling increasing shortness of breath for the last several days, also noted to have generalized pain all over his body.  He denies lower extremity swelling, hematemesis, dizziness.    He says that he has hemorrhoids and for the last several years, he has noted bright red stool once every 3 times on average when he goes to the bathroom.     Per care everywhere, He was diagnosed with acute myeloid leukemia via bone marrow biopsy on May 10, 2021.  He was started treatment on Azacitadine SC 5+2 75 mg starting 6/28/21 x 5 doses for his first cycle. Last had CBC on June 24, 2021.  Hemoglobin 7.4, Platelet 68.  Creatinine 1.7.  He completed his first cycle of chemotherapy on July 2 and is due for second cycle of chemotherapy on July 26.      In ED, patient found to be borderline tachycardic.  Remarkable labs include pancytopenia with hemoglobin 6.5, creatinine 2.27, troponin 28, BNP 1581, PT 87.4, INR over 10     I discussed the plan of care with patient.    Review of Systems  ROS  As per HPI    Past Medical History  Leukemia on chemotherapy     Surgical History  No pertinent surgical history    Family History     Family history reviewed with patient. There is no family history that is pertinent to the chief complaint.     Social History       Allergies  Allergies   Allergen Reactions   • Fiorinal    • Other Misc      Paper tape         Medications  Prior to Admission Medications   Prescriptions Last Dose Informant Patient Reported? Taking?   Ciclesonide  (ALVESCO) 160 MCG/ACT Aero Soln 7/16/2021 at am Patient Yes Yes   Sig: Inhale 1 Puff every day.   Glucosamine-Chondroit-Vit C-Mn (GLUCOSAMINE-CHONDROITIN) Cap 7/16/2021 at am Patient Yes No   Sig: Take 1 tablet by mouth every day.   Phytonadione 500 MCG TABLET DISPERSIBLE 7/16/2021 at Unknown time Patient Yes Yes   Sig: Take 500 mcg by mouth every day.   acetaminophen (TYLENOL) 325 MG Tab 7/16/2021 at Unknown time Patient Yes Yes   Sig: Take 650 mg by mouth.   albuterol (VENTOLIN HFA) 108 (90 Base) MCG/ACT Aero Soln inhalation aerosol 7/16/2021 at Unknown time Patient Yes Yes   Sig: Inhale 2 Puffs 1 time a day as needed for Shortness of Breath.   aspirin 81 MG EC tablet 7/16/2021 at am Patient Yes No   Sig: Take 81 mg by mouth every day.   atorvastatin (LIPITOR) 40 MG Tab 7/16/2021 at pm Patient Yes Yes   Sig: Take 40 mg by mouth every evening.   buPROPion SR (WELLBUTRIN-SR) 150 MG TABLET SR 12 HR sustained-release tablet 7/16/2021 at am Patient Yes Yes   Sig: Take 150 mg by mouth 2 times a day.   cyanocobalamin (VITAMIN B-12) 1000 MCG/ML Solution 7/16/2021 at Unknown time Patient Yes Yes   Sig: Inject 1,000 mcg into the shoulder, thigh, or buttocks every day.   furosemide (LASIX) 20 MG Tab 7/16/2021 at am Patient Yes Yes   Sig: Take 60 mg by mouth 2 times a day.   insulin NPH (HUMULIN N) 100 UNIT/ML Suspension 7/16/2021 at am Patient Yes Yes   Sig: Inject 16 Units under the skin every morning.   lisinopril (PRINIVIL) 5 MG Tab 7/16/2021 at am Patient Yes Yes   Sig: Take 5 mg by mouth every day.   metformin (GLUCOPHAGE) 1000 MG tablet 7/16/2021 at pm Patient Yes Yes   Sig: Take 500-1,000 mg by mouth every day. 1,000 mg in the morning 500 mg in the evening   metoprolol tartrate (LOPRESSOR) 25 MG Tab 7/16/2021 at pm Patient Yes Yes   Sig: Take 25 mg by mouth 2 times a day.   ondansetron (ZOFRAN ODT) 8 MG TABLET DISPERSIBLE 7/16/2021 at Unknown time Patient Yes Yes   Sig: Take 8 mg by mouth 2 times a day. Before meals    pantoprazole (PROTONIX) 40 MG Tablet Delayed Response 7/16/2021 at am Patient Yes Yes   Sig: Take 40 mg by mouth every day.   prochlorperazine (COMPAZINE) 10 MG Tab prn at prn Patient Yes Yes   Sig: Take 10 mg by mouth 2 times a day. After meals   warfarin (COUMADIN) 5 MG Tab 7/16/2021 at pm Patient Yes Yes   Sig: Take 7.5-10 mg by mouth every day. 7.5 mg on Saturday, Sunday, Tuesday, Thursday 10mg on Monday, Wednesday, Friday      Facility-Administered Medications: None       Physical Exam  Temp:  [36.2 °C (97.2 °F)-37 °C (98.6 °F)] 37 °C (98.6 °F)  Pulse:  [103-135] 104  Resp:  [16-22] 20  BP: (100-114)/(56-65) 114/56  SpO2:  [90 %-98 %] 94 %    Physical Exam  Physical Exam  Constitutional:       Appearance: Normal appearance. He is obese.   HENT:      Head: Normocephalic.      Nose: Nose normal.      Mouth/Throat:      Mouth: Mucous membranes are moist.   Eyes:      Pupils: Pupils are equal, round, and reactive to light.   Cardiovascular:      Rate and Rhythm: Normal rate and regular rhythm.   Pulmonary:      Effort: Pulmonary effort is normal.      Comments: Mild Rales heard bilaterally  Abdominal:      General: Abdomen is flat.      Palpations: Abdomen is soft.   Musculoskeletal:      Cervical back: Normal range of motion.      Comments: Bilateral lower extremity chronic venous changes noted  No pitting edema noted  Warm to touch   Skin:     General: Skin is warm.   Neurological:      General: No focal deficit present.      Mental Status: He is alert and oriented to person, place, and time. Mental status is at baseline.           Laboratory:  Recent Labs     07/16/21 2154   WBC 2.7*   RBC 2.00*   HEMOGLOBIN 6.5*   HEMATOCRIT 21.2*   .0*   MCH 32.5   MCHC 30.7*   RDW 71.4*   PLATELETCT 101*   MPV 11.1     Recent Labs     07/16/21 2154   SODIUM 137   POTASSIUM 4.1   CHLORIDE 99   CO2 19*   GLUCOSE 171*   BUN 21   CREATININE 2.27*   CALCIUM 8.7     Recent Labs     07/16/21 2154   ALTSGPT 10   ASTSGOT  17   ALKPHOSPHAT 85   TBILIRUBIN 0.8   LIPASE 8*   GLUCOSE 171*     Recent Labs     07/16/21 2154   APTT 128.1*   INR >=10.00*     Recent Labs     07/16/21 2154   NTPROBNP 1581*         Recent Labs     07/16/21 2154   TROPONINT 28*       Imaging:  DX-CHEST-PORTABLE (1 VIEW)   Final Result            1. Mild interstitial prominence could relate to mild fluid overload.   2. Left basilar opacity could be atelectasis, scarring or infection.   3. Several sternal wires are fractured.          X-Ray:  I have personally reviewed the images and compared with prior images.    Assessment/Plan:  I anticipate this patient will require at least two midnights for appropriate medical management, necessitating inpatient admission.    * Symptomatic anemia  Assessment & Plan  Hemoglobin 6.5, likely due to combination of chemotherapy and leukemia  Transfuse 1 unit of blood, serial CBC  Lasix 40 mg IV x1 as patient has rales on physical exam  Has history of hemorrhoids.  Doubt active bleed at this time.  Continue prilosec 20 mg po daily     DM (diabetes mellitus) ([facility])  Assessment & Plan  Sliding scale as needed    CARMEN (acute kidney injury) ([facility])  Assessment & Plan  Likely intrinsic kidney failure, likely multifactorial cause  Carefully diurese  Consider renal ultrasound  Serial BMP    CAD S/P percutaneous coronary angioplasty  Assessment & Plan  Currently only taking aspirin status post stent  Hold aspirin for now due to anemia    Hyperlipidemia  Assessment & Plan  Continue Lipitor 40 mg every night    Hypertension  Assessment & Plan  Restart blood pressure medication as needed    Obstructive sleep apnea  Assessment & Plan  Sleeps with CPAP at night  CPAP at night     Morbid obesity ([facility])  Assessment & Plan  15 minutes spent counseling patient on weight loss, nutrition, and healthy lifestyle      AML (acute myeloblastic leukemia) ([facility])  Assessment & Plan  Recently completed first cycle of chemotherapy on July 2, next cycle next week  in [place]  Follow-up oncology outpatient    Supratherapeutic INR  Assessment & Plan  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

## 2021-07-18 VITALS
DIASTOLIC BLOOD PRESSURE: 68 MMHG | OXYGEN SATURATION: 98 % | SYSTOLIC BLOOD PRESSURE: 114 MMHG | RESPIRATION RATE: 20 BRPM | HEART RATE: 105 BPM | BODY MASS INDEX: 37.22 KG/M2 | TEMPERATURE: 98.2 F | HEIGHT: 70 IN | WEIGHT: 260 LBS

## 2021-07-18 PROBLEM — R11.2 NON-INTRACTABLE VOMITING WITH NAUSEA: Status: RESOLVED | Noted: 2021-07-17 | Resolved: 2021-07-18

## 2021-07-18 PROBLEM — N17.9 AKI (ACUTE KIDNEY INJURY) (HCC): Status: RESOLVED | Noted: 2021-07-17 | Resolved: 2021-07-18

## 2021-07-18 PROBLEM — E87.29 METABOLIC ACIDOSIS, INCREASED ANION GAP: Status: RESOLVED | Noted: 2021-07-17 | Resolved: 2021-07-18

## 2021-07-18 PROBLEM — J96.01 ACUTE RESPIRATORY FAILURE WITH HYPOXIA (HCC): Status: RESOLVED | Noted: 2021-07-17 | Resolved: 2021-07-18

## 2021-07-18 PROBLEM — R79.1 SUPRATHERAPEUTIC INR: Status: RESOLVED | Noted: 2021-07-17 | Resolved: 2021-07-18

## 2021-07-18 LAB
ANION GAP SERPL CALC-SCNC: 12 MMOL/L (ref 7–16)
BUN SERPL-MCNC: 31 MG/DL (ref 8–22)
C DIFF DNA SPEC QL NAA+PROBE: NEGATIVE
C DIFF TOX GENS STL QL NAA+PROBE: NEGATIVE
CALCIUM SERPL-MCNC: 8.3 MG/DL (ref 8.5–10.5)
CHLORIDE SERPL-SCNC: 98 MMOL/L (ref 96–112)
CO2 SERPL-SCNC: 22 MMOL/L (ref 20–33)
CREAT SERPL-MCNC: 1.87 MG/DL (ref 0.5–1.4)
EKG IMPRESSION: NORMAL
ERYTHROCYTE [DISTWIDTH] IN BLOOD BY AUTOMATED COUNT: 77.1 FL (ref 35.9–50)
GLUCOSE SERPL-MCNC: 157 MG/DL (ref 65–99)
HCT VFR BLD AUTO: 22.5 % (ref 42–52)
HGB BLD-MCNC: 7.3 G/DL (ref 14–18)
INR PPP: 1.72 (ref 0.87–1.13)
MCH RBC QN AUTO: 31.9 PG (ref 27–33)
MCHC RBC AUTO-ENTMCNC: 32.4 G/DL (ref 33.7–35.3)
MCV RBC AUTO: 98.3 FL (ref 81.4–97.8)
PLATELET # BLD AUTO: 90 K/UL (ref 164–446)
PMV BLD AUTO: 11.1 FL (ref 9–12.9)
POTASSIUM SERPL-SCNC: 3.5 MMOL/L (ref 3.6–5.5)
PROTHROMBIN TIME: 19.6 SEC (ref 12–14.6)
RBC # BLD AUTO: 2.29 M/UL (ref 4.7–6.1)
SODIUM SERPL-SCNC: 132 MMOL/L (ref 135–145)
WBC # BLD AUTO: 2.4 K/UL (ref 4.8–10.8)

## 2021-07-18 PROCEDURE — 85610 PROTHROMBIN TIME: CPT

## 2021-07-18 PROCEDURE — 99239 HOSP IP/OBS DSCHRG MGMT >30: CPT | Performed by: STUDENT IN AN ORGANIZED HEALTH CARE EDUCATION/TRAINING PROGRAM

## 2021-07-18 PROCEDURE — 80048 BASIC METABOLIC PNL TOTAL CA: CPT

## 2021-07-18 PROCEDURE — A9270 NON-COVERED ITEM OR SERVICE: HCPCS | Performed by: STUDENT IN AN ORGANIZED HEALTH CARE EDUCATION/TRAINING PROGRAM

## 2021-07-18 PROCEDURE — 700102 HCHG RX REV CODE 250 W/ 637 OVERRIDE(OP): Performed by: STUDENT IN AN ORGANIZED HEALTH CARE EDUCATION/TRAINING PROGRAM

## 2021-07-18 PROCEDURE — P9016 RBC LEUKOCYTES REDUCED: HCPCS

## 2021-07-18 PROCEDURE — 86945 BLOOD PRODUCT/IRRADIATION: CPT

## 2021-07-18 PROCEDURE — 36415 COLL VENOUS BLD VENIPUNCTURE: CPT

## 2021-07-18 PROCEDURE — 86923 COMPATIBILITY TEST ELECTRIC: CPT

## 2021-07-18 PROCEDURE — 87493 C DIFF AMPLIFIED PROBE: CPT

## 2021-07-18 PROCEDURE — 85027 COMPLETE CBC AUTOMATED: CPT

## 2021-07-18 PROCEDURE — 93010 ELECTROCARDIOGRAM REPORT: CPT | Performed by: INTERNAL MEDICINE

## 2021-07-18 PROCEDURE — 36430 TRANSFUSION BLD/BLD COMPNT: CPT

## 2021-07-18 RX ORDER — LOPERAMIDE HYDROCHLORIDE 2 MG/1
4 CAPSULE ORAL ONCE
Status: COMPLETED | OUTPATIENT
Start: 2021-07-18 | End: 2021-07-18

## 2021-07-18 RX ORDER — LOPERAMIDE HYDROCHLORIDE 2 MG/1
2 CAPSULE ORAL 4 TIMES DAILY PRN
Qty: 30 CAPSULE | COMMUNITY
Start: 2021-07-18

## 2021-07-18 RX ADMIN — BUPROPION HYDROCHLORIDE 150 MG: 150 TABLET, EXTENDED RELEASE ORAL at 05:31

## 2021-07-18 RX ADMIN — ATORVASTATIN CALCIUM 40 MG: 40 TABLET, FILM COATED ORAL at 17:40

## 2021-07-18 RX ADMIN — OMEPRAZOLE 20 MG: 20 CAPSULE, DELAYED RELEASE ORAL at 05:31

## 2021-07-18 RX ADMIN — LOPERAMIDE HYDROCHLORIDE 4 MG: 2 CAPSULE ORAL at 15:50

## 2021-07-18 RX ADMIN — BUPROPION HYDROCHLORIDE 150 MG: 150 TABLET, EXTENDED RELEASE ORAL at 17:40

## 2021-07-18 ASSESSMENT — COGNITIVE AND FUNCTIONAL STATUS - GENERAL
MOBILITY SCORE: 24
SUGGESTED CMS G CODE MODIFIER MOBILITY: CH
SUGGESTED CMS G CODE MODIFIER DAILY ACTIVITY: CH
DAILY ACTIVITIY SCORE: 24

## 2021-07-18 ASSESSMENT — LIFESTYLE VARIABLES
HAVE YOU EVER FELT YOU SHOULD CUT DOWN ON YOUR DRINKING: NO
EVER FELT BAD OR GUILTY ABOUT YOUR DRINKING: NO
ON A TYPICAL DAY WHEN YOU DRINK ALCOHOL HOW MANY DRINKS DO YOU HAVE: 0
EVER HAD A DRINK FIRST THING IN THE MORNING TO STEADY YOUR NERVES TO GET RID OF A HANGOVER: NO
TOTAL SCORE: 0
CONSUMPTION TOTAL: NEGATIVE
AVERAGE NUMBER OF DAYS PER WEEK YOU HAVE A DRINK CONTAINING ALCOHOL: 0
TOTAL SCORE: 0
TOTAL SCORE: 0
ALCOHOL_USE: NO
HAVE PEOPLE ANNOYED YOU BY CRITICIZING YOUR DRINKING: NO
HOW MANY TIMES IN THE PAST YEAR HAVE YOU HAD 5 OR MORE DRINKS IN A DAY: 0

## 2021-07-18 ASSESSMENT — PATIENT HEALTH QUESTIONNAIRE - PHQ9
1. LITTLE INTEREST OR PLEASURE IN DOING THINGS: NOT AT ALL
1. LITTLE INTEREST OR PLEASURE IN DOING THINGS: NOT AT ALL
SUM OF ALL RESPONSES TO PHQ9 QUESTIONS 1 AND 2: 0
SUM OF ALL RESPONSES TO PHQ9 QUESTIONS 1 AND 2: 0
2. FEELING DOWN, DEPRESSED, IRRITABLE, OR HOPELESS: NOT AT ALL

## 2021-07-18 NOTE — DISCHARGE SUMMARY
Discharge Summary    CHIEF COMPLAINT ON ADMISSION  Chief Complaint   Patient presents with   • GI Bleeding       Reason for Admission  Symptomatic Anemia    Admission Date  7/16/2021    CODE STATUS  Full Code    HPI & HOSPITAL COURSE  This is a 66 y.o. male with CAD s/p CABG, HTN, IDDM, SHON, h/o PE/DVT on warfarin, and AML on azacitidine here with AHRF, CARMEN, and normocytic anemia.    [place] and under the care of oncologist Dr. Lr at [place]. He developed dyspnea and generalized pain prompting ED visit. He was noted to have Hgb 6.5, INR >10 (confirmed by repeat testing), and AHRF. INR was reversed to 1.7 with IV vitamin K. He received 2 units of PRBC due to perceived non-responsiveness to first unit, which turned out to be an AM re-draw prior to administering the first unit. He responded insufficiently from 6.2 to 7.8 with 2 units PRBC, prompting workup for acute bleeding. Bowel movements were confirmed to be nonbloody and CT abdomen was negative for RP nor intraperitoneal bleed. He was hemodynamically stable, resolved CARMEN, and weaned from oxygen after transfusion. Due to ongoing tachycardia, decrease in Hgb to 7.3, and uncertain follow up timing with his oncologist, he was administered an additional PRBC prior to discharge.    During admission he had 3 loose BMs within 24h hours prompting C diff testing, which was negative.    His wife and son expressed concern about acute decline after initiating azacitidine cycle 1, which is presumably without venetoclax per Dr. Lr's notes due to multiple comorbidities. It is uncertain how the altitude in [place] affected him. I advised that they address their concerns with perceived prognosis and risks/benefits of treatment in light of diminishing quality of life. They inquired about hospice, which I advised he would qualify but should be discussed and coordinated in-network with his oncologist.    Therefore, he is discharged in guarded and stable  condition to home with close outpatient follow-up.    The patient met 2-midnight criteria for an inpatient stay at the time of discharge.    Discharge Date  7/18/2021    FOLLOW UP ITEMS POST DISCHARGE  1. AML - transfusion, further azacitidine, and GOC discussion to be determined by oncologist  2. Warfarin - instructed to restart on post-discharge day 1 and follow up with [place] anticoagulation services    DISCHARGE DIAGNOSES  Principal Problem:    Normocytic anemia POA: Yes  Active Problems:    Acute myeloid leukemia not having achieved remission (HCC) POA: Yes    Essential hypertension POA: Yes    Pancytopenia (HCC) POA: Yes    Severe obesity (BMI 35.0-35.9 with comorbidity) ([facility]) POA: Yes    Obstructive sleep apnea POA: Yes    Hyperlipidemia POA: Yes    Coronary artery disease involving native coronary artery of native heart without angina pectoris POA: Yes    Type 2 diabetes mellitus with kidney complication, with long-term current use of insulin ([facility]) POA: Yes    Current moderate episode of major depressive disorder ([facility]) POA: Yes    Gastroesophageal reflux disease without esophagitis POA: Yes  Resolved Problems:    Supratherapeutic INR POA: Yes    CARMEN (acute kidney injury) ([facility]) POA: Yes    Acute respiratory failure with hypoxia ([facility]) POA: Yes    Metabolic acidosis, increased anion gap POA: Yes    Non-intractable vomiting with nausea POA: Yes      FOLLOW UP  No future appointments.  No follow-up provider specified.    MEDICATIONS ON DISCHARGE     Medication List      CONTINUE taking these medications      Instructions   acetaminophen 325 MG Tabs  Commonly known as: Tylenol   Take 650 mg by mouth.  Dose: 650 mg     Alvesco 160 MCG/ACT Aers  Generic drug: Ciclesonide   Inhale 1 Puff every day.  Dose: 1 Puff     aspirin 81 MG EC tablet   Take 81 mg by mouth every day.  Dose: 81 mg     atorvastatin 40 MG Tabs  Commonly known as: LIPITOR   Take 40 mg by mouth every evening.  Dose: 40 mg     buPROPion  MG Tb12  sustained-release tablet  Commonly known as: WELLBUTRIN-SR   Take 150 mg by mouth 2 times a day.  Dose: 150 mg     cyanocobalamin 1000 MCG/ML Soln  Commonly known as: VITAMIN B-12   Inject 1,000 mcg into the shoulder, thigh, or buttocks every day.  Dose: 1,000 mcg     furosemide 20 MG Tabs  Commonly known as: LASIX   Take 60 mg by mouth 2 times a day.  Dose: 60 mg     Glucosamine-Chondroitin Caps   Take 1 tablet by mouth every day.  Dose: 1 tablet     HumuLIN N 100 UNIT/ML Susp  Generic drug: insulin NPH   Inject 16 Units under the skin every morning.  Dose: 16 Units     lisinopril 5 MG Tabs  Commonly known as: PRINIVIL   Take 5 mg by mouth every day.  Dose: 5 mg     loperamide 2 MG Caps  Commonly known as: IMODIUM   Take 1 capsule by mouth 4 times a day as needed for Diarrhea.  Dose: 2 mg     metformin 1000 MG tablet  Commonly known as: GLUCOPHAGE   Take 500-1,000 mg by mouth every day. 1,000 mg in the morning 500 mg in the evening  Dose: 500-1,000 mg     metoprolol tartrate 25 MG Tabs  Commonly known as: LOPRESSOR   Take 25 mg by mouth 2 times a day.  Dose: 25 mg     ondansetron 8 MG Tbdp  Commonly known as: ZOFRAN ODT   Take 8 mg by mouth 2 times a day. Before meals  Dose: 8 mg     pantoprazole 40 MG Tbec  Commonly known as: PROTONIX   Take 40 mg by mouth every day.  Dose: 40 mg     Phytonadione 500 MCG Tbdp   Take 500 mcg by mouth every day.  Dose: 500 mcg     prochlorperazine 10 MG Tabs  Commonly known as: COMPAZINE   Take 10 mg by mouth 2 times a day. After meals  Dose: 10 mg     Ventolin  (90 Base) MCG/ACT Aers inhalation aerosol  Generic drug: albuterol   Inhale 2 Puffs 1 time a day as needed for Shortness of Breath.  Dose: 2 Puff     warfarin 5 MG Tabs  Commonly known as: COUMADIN   Take 7.5-10 mg by mouth every day. 7.5 mg on Saturday, Sunday, Tuesday, Thursday 10mg on Monday, Wednesday, Friday  Dose: 7.5-10 mg            Allergies  Allergies   Allergen Reactions   • Fiorinal    • Other Misc       Paper tape         DIET  Orders Placed This Encounter   Procedures   • Diet Order Diet: Cardiac     Standing Status:   Standing     Number of Occurrences:   1     Order Specific Question:   Diet:     Answer:   Cardiac [6]       ACTIVITY  As tolerated.  Weight bearing as tolerated    CONSULTATIONS  None    PROCEDURES  None    LABORATORY  Lab Results   Component Value Date    SODIUM 132 (L) 07/18/2021    POTASSIUM 3.5 (L) 07/18/2021    CHLORIDE 98 07/18/2021    CO2 22 07/18/2021    GLUCOSE 157 (H) 07/18/2021    BUN 31 (H) 07/18/2021    CREATININE 1.87 (H) 07/18/2021        Lab Results   Component Value Date    WBC 2.4 (LL) 07/18/2021    HEMOGLOBIN 7.3 (L) 07/18/2021    HEMATOCRIT 22.5 (L) 07/18/2021    PLATELETCT 90 (L) 07/18/2021        Total time of the discharge process exceeds 50 minutes.

## 2021-07-18 NOTE — PROGRESS NOTES
Seferino from Lab called with critical result of WBC 2.0 at 1759. Critical lab result read back to Seferino.   This critical lab result is within parameters established by  for this patient

## 2021-07-18 NOTE — CARE PLAN
The patient is Watcher - Medium risk of patient condition declining or worsening    Shift Goals  Clinical Goals: hemodaynamic stablilty  Patient Goals: get CPAP    Progress made toward(s) clinical / shift goals:    Problem: Pain - Standard  Goal: Alleviation of pain or a reduction in pain to the patient’s comfort goal  Outcome: Progressing     Problem: Knowledge Deficit - Standard  Goal: Patient and family/care givers will demonstrate understanding of plan of care, disease process/condition, diagnostic tests and medications  Outcome: Progressing     Problem: Fall Risk  Goal: Patient will remain free from falls  Outcome: Progressing       Patient is not progressing towards the following goals:

## 2021-07-18 NOTE — ASSESSMENT & PLAN NOTE
Possibly exacerbated by symptomatic anemia, transfusions, and altitude  CXR with mild congestion  Monitoring oxygen with transfusions, if signs of hypoxia will diurese for TACO and obtain TTE

## 2021-07-18 NOTE — ASSESSMENT & PLAN NOTE
Due to AML and azacitidine  Likely acutely exacerbated by IVF, which has been discontinued  Standing transfusion order for Hgb <7 and Plt <10

## 2021-07-19 NOTE — PROGRESS NOTES
PIV removed, gauze and coband applied, no bleeding noted.  Discharge paperwork reviewed with pt and wife. No questions for this RN.  Pt and all belongings wheeled off unit to car.

## 2021-07-19 NOTE — DISCHARGE INSTRUCTIONS
Discharge Instructions per Pritesh Aleman M.D.    You were hospitalized for symptomatic anemia due to AML. Workup for GI bleeding and bleeding in your abdomen were negative.  Your kidneys, oxygen, and blood counts improved with transfusion.    Warfarin was reversed (antidote) due to high INR >10. Please follow up for instructions on how to dose it.    DIET: Regular    ACTIVITY: Regular    DIAGNOSIS: Symptomatic anemia and CARMEN due to anemia from AML    Return to ER if you faint for any reason, develop sudden shortness of breath, develop chest pain, or notice blood in your bowels or cough.    Follow up with Dr. Lr to discuss treatment plan for next cycle of azacitidine.   Share your concerns about the toll it has taken and whether remaining care should focus on quality of life.    Discharge Instructions    Discharged to home by car with relative. Discharged via wheelchair, hospital escort: Yes.  Special equipment needed: Not Applicable    Be sure to schedule a follow-up appointment with your primary care doctor or any specialists as instructed.     Discharge Plan:        I understand that a diet low in cholesterol, fat, and sodium is recommended for good health. Unless I have been given specific instructions below for another diet, I accept this instruction as my diet prescription.   Other diet: cardiac diet     Special Instructions: None    · Is patient discharged on Warfarin / Coumadin?   No     Depression / Suicide Risk    As you are discharged from this [facility] facility, it is important to learn how to keep safe from harming yourself.    Recognize the warning signs:  · Abrupt changes in personality, positive or negative- including increase in energy   · Giving away possessions  · Change in eating patterns- significant weight changes-  positive or negative  · Change in sleeping patterns- unable to sleep or sleeping all the time   · Unwillingness or inability to communicate  · Depression  · Unusual sadness,  discouragement and loneliness  · Talk of wanting to die  · Neglect of personal appearance   · Rebelliousness- reckless behavior  · Withdrawal from people/activities they love  · Confusion- inability to concentrate     If you or a loved one observes any of these behaviors or has concerns about self-harm, here's what you can do:  · Talk about it- your feelings and reasons for harming yourself  · Remove any means that you might use to hurt yourself (examples: pills, rope, extension cords, firearm)  · Get professional help from the community (Mental Health, Substance Abuse, psychological counseling)  · Do not be alone:Call your Safe Contact- someone whom you trust who will be there for you.  · Call your local CRISIS HOTLINE 006-5931 or 197-830-4320  · Call your local Children's Mobile Crisis Response Team [place] (916) 169-3311 or www.[place]  · Call the toll free National Suicide Prevention Hotlines   · National Suicide Prevention Lifeline 129-442-EWGT (1558)  · National Hope Line Network 800-SUICIDE (566-2328)

## 2021-07-19 NOTE — CARE PLAN
The patient is Stable - Low risk of patient condition declining or worsening    Shift Goals  Clinical Goals: hemodaynamic stablilty  Patient Goals: ambulate    Progress made toward(s) clinical / shift goals:  see cp notes    Problem: Gastrointestinal Irritability  Goal: Nausea and vomiting will be absent or improve  Note: Pt denied nausea this shift.   Goal: Diarrhea will be absent or improved  Note: Pt had diarrhea this shift. C-diff ruled out.      Problem: Mobility  Goal: Patient's capacity to carry out activities will improve  Outcome: Progressing  Flowsheets (Taken 7/18/2021 1850)  Mobility:   Encouraged mobilization per interdisciplinary team recommendations   Provided assistive devices  Note: Pt ambulated james with FWW, on room air. Pt ambulated safely with SBA.                  Patient is not progressing towards the following goals: